# Patient Record
Sex: FEMALE | Race: ASIAN | NOT HISPANIC OR LATINO | URBAN - METROPOLITAN AREA
[De-identification: names, ages, dates, MRNs, and addresses within clinical notes are randomized per-mention and may not be internally consistent; named-entity substitution may affect disease eponyms.]

---

## 2017-05-09 ENCOUNTER — EMERGENCY (EMERGENCY)
Facility: HOSPITAL | Age: 37
LOS: 1 days | Discharge: HOME | End: 2017-05-09
Attending: EMERGENCY MEDICINE | Admitting: EMERGENCY MEDICINE
Payer: COMMERCIAL

## 2017-05-09 VITALS
OXYGEN SATURATION: 100 % | HEART RATE: 75 BPM | RESPIRATION RATE: 20 BRPM | SYSTOLIC BLOOD PRESSURE: 115 MMHG | TEMPERATURE: 99 F | DIASTOLIC BLOOD PRESSURE: 71 MMHG

## 2017-05-09 PROCEDURE — 99283 EMERGENCY DEPT VISIT LOW MDM: CPT

## 2017-05-09 NOTE — ED PROVIDER NOTE - MUSCULOSKELETAL NECK EXAM
PAIN ON MOVEMENT/Mild right trapezius tenderness. No significant tenderness. Pain with neck range of motion. No midline or bony tenderness. PAIN ON MOVEMENT/Mild right trapezius tenderness. No significant tenderness. Pain with neck range of motion to left. No midline or bony tenderness.

## 2017-05-09 NOTE — ED PROVIDER NOTE - NEUROLOGICAL, MLM
Alert and oriented, no focal deficits, no motor or sensory deficits. 5/5 motor strength in all four extremities. 2+ reflexes
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
## 2017-05-09 NOTE — ED PROVIDER NOTE - NS ED MD SCRIBE ATTENDING SCRIBE SECTIONS
DISPOSITION/REVIEW OF SYSTEMS/VITAL SIGNS( Pullset)/PHYSICAL EXAM/HISTORY OF PRESENT ILLNESS/PAST MEDICAL/SURGICAL/SOCIAL HISTORY/HIV
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
## 2017-05-09 NOTE — ED PROVIDER NOTE - DETAILS:
The scribe's documentation has been prepared under my direction and personally reviewed by me in its entirety. I confirm that the note above accurately reflects all work, treatment, procedures, and medical decision making performed by Krishna Peña MD

## 2021-12-03 PROBLEM — J30.2 OTHER SEASONAL ALLERGIC RHINITIS: Chronic | Status: ACTIVE | Noted: 2017-05-09

## 2021-12-23 ENCOUNTER — APPOINTMENT (OUTPATIENT)
Dept: ANTEPARTUM | Facility: CLINIC | Age: 41
End: 2021-12-23
Payer: COMMERCIAL

## 2021-12-23 ENCOUNTER — ASOB RESULT (OUTPATIENT)
Age: 41
End: 2021-12-23

## 2021-12-23 PROCEDURE — 99204 OFFICE O/P NEW MOD 45 MIN: CPT | Mod: 25

## 2021-12-23 PROCEDURE — 76813 OB US NUCHAL MEAS 1 GEST: CPT | Mod: 59

## 2021-12-23 PROCEDURE — 76801 OB US < 14 WKS SINGLE FETUS: CPT

## 2021-12-23 PROCEDURE — 36416 COLLJ CAPILLARY BLOOD SPEC: CPT

## 2021-12-29 ENCOUNTER — ASOB RESULT (OUTPATIENT)
Age: 41
End: 2021-12-29

## 2021-12-29 ENCOUNTER — APPOINTMENT (OUTPATIENT)
Dept: MATERNAL FETAL MEDICINE | Facility: CLINIC | Age: 41
End: 2021-12-29

## 2022-01-04 ENCOUNTER — NON-APPOINTMENT (OUTPATIENT)
Age: 42
End: 2022-01-04

## 2022-01-07 ENCOUNTER — APPOINTMENT (OUTPATIENT)
Dept: OBGYN | Facility: CLINIC | Age: 42
End: 2022-01-07
Payer: COMMERCIAL

## 2022-01-07 VITALS
BODY MASS INDEX: 21.82 KG/M2 | SYSTOLIC BLOOD PRESSURE: 120 MMHG | WEIGHT: 139 LBS | HEART RATE: 116 BPM | HEIGHT: 67 IN | DIASTOLIC BLOOD PRESSURE: 86 MMHG

## 2022-01-07 DIAGNOSIS — Z76.89 PERSONS ENCOUNTERING HEALTH SERVICES IN OTHER SPECIFIED CIRCUMSTANCES: ICD-10-CM

## 2022-01-07 PROCEDURE — 99214 OFFICE O/P EST MOD 30 MIN: CPT

## 2022-01-07 PROCEDURE — 99204 OFFICE O/P NEW MOD 45 MIN: CPT

## 2022-01-08 LAB
C TRACH RRNA SPEC QL NAA+PROBE: NOT DETECTED
N GONORRHOEA RRNA SPEC QL NAA+PROBE: NOT DETECTED
SOURCE AMPLIFICATION: NORMAL

## 2022-01-10 DIAGNOSIS — Z01.818 ENCOUNTER FOR OTHER PREPROCEDURAL EXAMINATION: ICD-10-CM

## 2022-01-11 ENCOUNTER — NON-APPOINTMENT (OUTPATIENT)
Age: 42
End: 2022-01-11

## 2022-01-11 ENCOUNTER — APPOINTMENT (OUTPATIENT)
Dept: OBGYN | Facility: CLINIC | Age: 42
End: 2022-01-11

## 2022-01-11 ENCOUNTER — OUTPATIENT (OUTPATIENT)
Dept: OUTPATIENT SERVICES | Facility: HOSPITAL | Age: 42
LOS: 1 days | End: 2022-01-11

## 2022-01-11 VITALS
HEART RATE: 90 BPM | WEIGHT: 141.1 LBS | OXYGEN SATURATION: 99 % | TEMPERATURE: 99 F | SYSTOLIC BLOOD PRESSURE: 120 MMHG | DIASTOLIC BLOOD PRESSURE: 70 MMHG | RESPIRATION RATE: 17 BRPM | HEIGHT: 67 IN

## 2022-01-11 DIAGNOSIS — O02.1 MISSED ABORTION: ICD-10-CM

## 2022-01-11 DIAGNOSIS — Z76.89 PERSONS ENCOUNTERING HEALTH SERVICES IN OTHER SPECIFIED CIRCUMSTANCES: ICD-10-CM

## 2022-01-11 LAB
ANION GAP SERPL CALC-SCNC: 12 MMOL/L — SIGNIFICANT CHANGE UP (ref 7–14)
BUN SERPL-MCNC: 8 MG/DL — SIGNIFICANT CHANGE UP (ref 7–23)
CALCIUM SERPL-MCNC: 9.2 MG/DL — SIGNIFICANT CHANGE UP (ref 8.4–10.5)
CHLORIDE SERPL-SCNC: 101 MMOL/L — SIGNIFICANT CHANGE UP (ref 98–107)
CO2 SERPL-SCNC: 24 MMOL/L — SIGNIFICANT CHANGE UP (ref 22–31)
CREAT SERPL-MCNC: 0.64 MG/DL — SIGNIFICANT CHANGE UP (ref 0.5–1.3)
GLUCOSE SERPL-MCNC: 106 MG/DL — HIGH (ref 70–99)
HCT VFR BLD CALC: 40.7 % — SIGNIFICANT CHANGE UP (ref 34.5–45)
HGB BLD-MCNC: 13.8 G/DL — SIGNIFICANT CHANGE UP (ref 11.5–15.5)
MCHC RBC-ENTMCNC: 30.1 PG — SIGNIFICANT CHANGE UP (ref 27–34)
MCHC RBC-ENTMCNC: 33.9 GM/DL — SIGNIFICANT CHANGE UP (ref 32–36)
MCV RBC AUTO: 88.7 FL — SIGNIFICANT CHANGE UP (ref 80–100)
NRBC # BLD: 0 /100 WBCS — SIGNIFICANT CHANGE UP
NRBC # FLD: 0 K/UL — SIGNIFICANT CHANGE UP
PLATELET # BLD AUTO: 262 K/UL — SIGNIFICANT CHANGE UP (ref 150–400)
POTASSIUM SERPL-MCNC: 3.7 MMOL/L — SIGNIFICANT CHANGE UP (ref 3.5–5.3)
POTASSIUM SERPL-SCNC: 3.7 MMOL/L — SIGNIFICANT CHANGE UP (ref 3.5–5.3)
RBC # BLD: 4.59 M/UL — SIGNIFICANT CHANGE UP (ref 3.8–5.2)
RBC # FLD: 12.2 % — SIGNIFICANT CHANGE UP (ref 10.3–14.5)
SODIUM SERPL-SCNC: 137 MMOL/L — SIGNIFICANT CHANGE UP (ref 135–145)
WBC # BLD: 7.13 K/UL — SIGNIFICANT CHANGE UP (ref 3.8–10.5)
WBC # FLD AUTO: 7.13 K/UL — SIGNIFICANT CHANGE UP (ref 3.8–10.5)

## 2022-01-11 RX ORDER — SODIUM CHLORIDE 9 MG/ML
1000 INJECTION, SOLUTION INTRAVENOUS
Refills: 0 | Status: DISCONTINUED | OUTPATIENT
Start: 2022-01-14 | End: 2022-01-28

## 2022-01-11 RX ORDER — ASPIRIN/CALCIUM CARB/MAGNESIUM 324 MG
1 TABLET ORAL
Qty: 0 | Refills: 0 | DISCHARGE

## 2022-01-11 NOTE — H&P PST ADULT - HISTORY OF PRESENT ILLNESS
41 year old female  , with PMH of GDM presents to PST with pre op diagnosis of incomplete , for pre op evaluation prior to scheduled surgery - Dilation and evacuation. Pt reports of routine pelvic sonogram for 13 weeks of pregnancy and found to have absence of fetal heart rate, Plan for D&C. 41 year old female  , with PMH of GDM presents to PST with pre op diagnosis of incomplete , for pre op evaluation prior to scheduled surgery - Dilation and evacuation. Pt reports of routine pelvic sonogram for 13 weeks of pregnancy showed fetal abnormality  and found to have absence of fetal heart rate, Plan for D&C. Denies vaginal bleeding or abdominal pain.

## 2022-01-11 NOTE — H&P PST ADULT - PROBLEM SELECTOR PLAN 1
Pt is tentatively scheduled for Dilation and evacuation on 01/14/2022.  Pepcid provided for GI prophylaxis.  Pre op instructions given.  Covid test was done today, pending results.

## 2022-01-11 NOTE — H&P PST ADULT - NSICDXPASTMEDICALHX_GEN_ALL_CORE_FT
PAST MEDICAL HISTORY:  Gestational diabetes     Seasonal allergies      PAST MEDICAL HISTORY:  Cystic hygroma this pregnancy as per MFM notes    Gestational diabetes 2nd pregnancy    Seasonal allergies

## 2022-01-13 ENCOUNTER — TRANSCRIPTION ENCOUNTER (OUTPATIENT)
Age: 42
End: 2022-01-13

## 2022-01-13 LAB — SARS-COV-2 N GENE NPH QL NAA+PROBE: NOT DETECTED

## 2022-01-13 NOTE — ASU PATIENT PROFILE, ADULT - NSCAFFEINEWITH_GEN_ALL_CORE_SD
May 24, 2017      Eliana MARCO Alarconen  [address] [place] [address]    Dear ,      I am happy to inform you that your recent cervical cancer screening test (PAP smear) was normal.      Preventative screenings such as this help to ensure your health for years to come. You should repeat a pap smear in 1 year, unless otherwise directed.      You will still need to return to the clinic every year for your annual exam and other preventive tests.     Please contact the clinic at [phone] if you have further questions.       Sincerely,      Juan Love MD/ronak       none

## 2022-01-13 NOTE — ASU PATIENT PROFILE, ADULT - NSICDXPASTMEDICALHX_GEN_ALL_CORE_FT
PAST MEDICAL HISTORY:  Cystic hygroma this pregnancy as per MFM notes    Gestational diabetes 2nd pregnancy    Seasonal allergies

## 2022-01-13 NOTE — ASU PATIENT PROFILE, ADULT - FALL HARM RISK - UNIVERSAL INTERVENTIONS
Bed in lowest position, wheels locked, appropriate side rails in place/Call bell, personal items and telephone in reach/Instruct patient to call for assistance before getting out of bed or chair/Non-slip footwear when patient is out of bed/[place] to call system/Physically safe environment - no spills, clutter or unnecessary equipment/Purposeful Proactive Rounding/Room/bathroom lighting operational, light cord in reach

## 2022-01-14 ENCOUNTER — OUTPATIENT (OUTPATIENT)
Dept: OUTPATIENT SERVICES | Facility: HOSPITAL | Age: 42
LOS: 1 days | Discharge: HOME | End: 2022-01-14
Payer: COMMERCIAL

## 2022-01-14 ENCOUNTER — OUTPATIENT (OUTPATIENT)
Dept: OUTPATIENT SERVICES | Facility: HOSPITAL | Age: 42
LOS: 1 days | End: 2022-01-14
Payer: COMMERCIAL

## 2022-01-14 ENCOUNTER — APPOINTMENT (OUTPATIENT)
Dept: OBGYN | Facility: HOSPITAL | Age: 42
End: 2022-01-14

## 2022-01-14 ENCOUNTER — RESULT REVIEW (OUTPATIENT)
Age: 42
End: 2022-01-14

## 2022-01-14 VITALS
DIASTOLIC BLOOD PRESSURE: 63 MMHG | OXYGEN SATURATION: 100 % | HEART RATE: 108 BPM | RESPIRATION RATE: 17 BRPM | HEIGHT: 67 IN | TEMPERATURE: 100 F | SYSTOLIC BLOOD PRESSURE: 132 MMHG | WEIGHT: 141.1 LBS

## 2022-01-14 VITALS
RESPIRATION RATE: 18 BRPM | SYSTOLIC BLOOD PRESSURE: 124 MMHG | TEMPERATURE: 100 F | HEART RATE: 88 BPM | OXYGEN SATURATION: 100 % | DIASTOLIC BLOOD PRESSURE: 62 MMHG

## 2022-01-14 DIAGNOSIS — O02.1 MISSED ABORTION: ICD-10-CM

## 2022-01-14 DIAGNOSIS — Z76.89 PERSONS ENCOUNTERING HEALTH SERVICES IN OTHER SPECIFIED CIRCUMSTANCES: ICD-10-CM

## 2022-01-14 LAB
BASOPHILS # BLD AUTO: 0.03 K/UL — SIGNIFICANT CHANGE UP (ref 0–0.2)
BASOPHILS NFR BLD AUTO: 0.2 % — SIGNIFICANT CHANGE UP (ref 0–2)
EOSINOPHIL # BLD AUTO: 0 K/UL — SIGNIFICANT CHANGE UP (ref 0–0.5)
EOSINOPHIL NFR BLD AUTO: 0 % — SIGNIFICANT CHANGE UP (ref 0–6)
HCT VFR BLD CALC: 42.1 % — SIGNIFICANT CHANGE UP (ref 34.5–45)
HGB BLD-MCNC: 14.4 G/DL — SIGNIFICANT CHANGE UP (ref 11.5–15.5)
IANC: 14.1 K/UL — HIGH (ref 1.5–8.5)
IMM GRANULOCYTES NFR BLD AUTO: 0.3 % — SIGNIFICANT CHANGE UP (ref 0–1.5)
LYMPHOCYTES # BLD AUTO: 0.6 K/UL — LOW (ref 1–3.3)
LYMPHOCYTES # BLD AUTO: 4 % — LOW (ref 13–44)
MCHC RBC-ENTMCNC: 29.9 PG — SIGNIFICANT CHANGE UP (ref 27–34)
MCHC RBC-ENTMCNC: 34.2 GM/DL — SIGNIFICANT CHANGE UP (ref 32–36)
MCV RBC AUTO: 87.3 FL — SIGNIFICANT CHANGE UP (ref 80–100)
MONOCYTES # BLD AUTO: 0.09 K/UL — SIGNIFICANT CHANGE UP (ref 0–0.9)
MONOCYTES NFR BLD AUTO: 0.6 % — LOW (ref 2–14)
NEUTROPHILS # BLD AUTO: 14.1 K/UL — HIGH (ref 1.8–7.4)
NEUTROPHILS NFR BLD AUTO: 94.9 % — HIGH (ref 43–77)
NRBC # BLD: 0 /100 WBCS — SIGNIFICANT CHANGE UP
NRBC # FLD: 0 K/UL — SIGNIFICANT CHANGE UP
PLATELET # BLD AUTO: 262 K/UL — SIGNIFICANT CHANGE UP (ref 150–400)
RBC # BLD: 4.82 M/UL — SIGNIFICANT CHANGE UP (ref 3.8–5.2)
RBC # FLD: 11.9 % — SIGNIFICANT CHANGE UP (ref 10.3–14.5)
WBC # BLD: 14.86 K/UL — HIGH (ref 3.8–10.5)
WBC # FLD AUTO: 14.86 K/UL — HIGH (ref 3.8–10.5)

## 2022-01-14 PROCEDURE — 81229 CYTOG ALYS CHRML ABNR SNPCGH: CPT

## 2022-01-14 PROCEDURE — 88305 TISSUE EXAM BY PATHOLOGIST: CPT | Mod: 26

## 2022-01-14 PROCEDURE — 59841 INDUCED ABORTION DILAT&EVAC: CPT | Mod: GC

## 2022-01-14 PROCEDURE — 88233 TISSUE CULTURE SKIN/BIOPSY: CPT

## 2022-01-14 RX ORDER — SODIUM CHLORIDE 9 MG/ML
500 INJECTION, SOLUTION INTRAVENOUS ONCE
Refills: 0 | Status: COMPLETED | OUTPATIENT
Start: 2022-01-14 | End: 2022-01-14

## 2022-01-14 RX ORDER — SODIUM CHLORIDE 9 MG/ML
1000 INJECTION, SOLUTION INTRAVENOUS
Refills: 0 | Status: DISCONTINUED | OUTPATIENT
Start: 2022-01-14 | End: 2022-01-28

## 2022-01-14 RX ORDER — IBUPROFEN 200 MG
1 TABLET ORAL
Qty: 0 | Refills: 0 | DISCHARGE

## 2022-01-14 RX ORDER — ONDANSETRON 8 MG/1
4 TABLET, FILM COATED ORAL
Refills: 0 | Status: DISCONTINUED | OUTPATIENT
Start: 2022-01-14 | End: 2022-01-28

## 2022-01-14 RX ORDER — ACETAMINOPHEN 500 MG
2 TABLET ORAL
Qty: 0 | Refills: 0 | DISCHARGE

## 2022-01-14 RX ORDER — FENTANYL CITRATE 50 UG/ML
50 INJECTION INTRAVENOUS
Refills: 0 | Status: DISCONTINUED | OUTPATIENT
Start: 2022-01-14 | End: 2022-01-14

## 2022-01-14 RX ADMIN — SODIUM CHLORIDE 1000 MILLILITER(S): 9 INJECTION, SOLUTION INTRAVENOUS at 16:45

## 2022-01-14 NOTE — ASU DISCHARGE PLAN (ADULT/PEDIATRIC) - NS MD DC FALL RISK RISK
For information on Fall & Injury Prevention, visit: [email or web address] OR  [email or web address] OR  https://www.cdc.gov/steadi/patient.html

## 2022-01-14 NOTE — BRIEF OPERATIVE NOTE - NSICDXBRIEFPOSTOP_GEN_ALL_CORE_FT
POST-OP DIAGNOSIS:  Fetal demise before 20 weeks with retention of dead fetus 14-Jan-2022 15:00:17  Manasa Bernal

## 2022-01-14 NOTE — CHART NOTE - NSCHARTNOTEFT_GEN_A_CORE
Patient seen and examined at bedside, recently post-op. Patient complained of lightheadedness, dizziness and weakness upon standing. The patient used a bedpan to urinate due to her weakness. Denies CP, SOB, N/V, fevers, and chills.    Vital Signs Last 24 Hours  T(C): 37.8 (01-14-22 @ 16:00), Max: 37.8 (01-14-22 @ 16:00)  HR: 89 (01-14-22 @ 16:00) (78 - 108)  BP: 111/65 (01-14-22 @ 16:00) (102/45 - 132/63)  RR: 16 (01-14-22 @ 16:00) (7 - 20)  SpO2: 98% (01-14-22 @ 16:00) (98% - 100%)      I&O's Summary      Physical Exam:  General: NAD  CV: NR, RR, S1, S2, no M/R/G  Lungs: CTA-B  Abdomen: Soft, appropriately tender, non-distended, +BS  Vagina: Bleeding   Ext: No pain or swelling    Labs:             13.8   7.13  )-----------( 262      ( 01-11 @ 19:23 )             40.7         MEDICATIONS  (STANDING):  lactated ringers Bolus 500 milliLiter(s) IV Bolus once  lactated ringers. 1000 milliLiter(s) (30 mL/Hr) IV Continuous <Continuous>  lactated ringers. 1000 milliLiter(s) (125 mL/Hr) IV Continuous <Continuous>    MEDICATIONS  (PRN):  fentaNYL    Injectable 50 MICROGram(s) IV Push every 15 minutes PRN Severe Pain (7 - 10)  ondansetron Injectable 4 milliGRAM(s) IV Push every 20 minutes PRN Nausea and/or Vomiting      yo s/p recovering well in acute post-operative state.    Neuro: Pain controlled. PO pain meds vs. PCA  CV: Hemodynamically stable  Pulm: Encourage oob/ambulation, incentive spirometer at bedside  GI: Regular Diet vs. NPO vs. Advance diet as tolerated  : Maki in place vs. voiding spontaneously vs. DTV@  Heme: HSQ and SCDs for DVT PPX  ID: afebrile  Endo: no active issues  Dispo: continue routine post-op care         JUNE Gaines, PGY-1  d/w GYN Team Patient seen and examined at bedside, recently post-op. Patient complained of lightheadedness, dizziness and weakness upon standing. The patient used a bedpan to urinate due to her weakness. Denies CP, SOB, N/V, fevers, and chills.    Vital Signs Last 24 Hours  T(C): 37.8 (01-14-22 @ 16:00), Max: 37.8 (01-14-22 @ 16:00)  HR: 89 (01-14-22 @ 16:00) (78 - 108)  BP: 111/65 (01-14-22 @ 16:00) (102/45 - 132/63)  RR: 16 (01-14-22 @ 16:00) (7 - 20)  SpO2: 98% (01-14-22 @ 16:00) (98% - 100%)      I&O's Summary      Physical Exam:  General: NAD  CV: NR, RR, S1, S2, no M/R/G  Lungs: CTA-B  Abdomen: Soft, appropriately tender, non-distended, +BS  Vagina: Bleeding   Ext: No pain or swelling    Labs:             13.8   7.13  )-----------( 262      ( 01-11 @ 19:23 )             40.7         MEDICATIONS  (STANDING):  lactated ringers Bolus 500 milliLiter(s) IV Bolus once  lactated ringers. 1000 milliLiter(s) (30 mL/Hr) IV Continuous <Continuous>  lactated ringers. 1000 milliLiter(s) (125 mL/Hr) IV Continuous <Continuous>    MEDICATIONS  (PRN):  fentaNYL    Injectable 50 MICROGram(s) IV Push every 15 minutes PRN Severe Pain (7 - 10)  ondansetron Injectable 4 milliGRAM(s) IV Push every 20 minutes PRN Nausea and/or Vomiting      yo s/p recovering well in acute post-operative state.    Neuro: Pain controlled. PO pain meds vs. PCA  CV: Hemodynamically stable  Pulm: Encourage oob/ambulation, incentive spirometer at bedside  GI: Regular Diet vs. NPO vs. Advance diet as tolerated  : Maki in place vs. voiding spontaneously vs. DTV@  Heme: HSQ and SCDs for DVT PPX  ID: afebrile  Endo: no active issues  Dispo: continue routine post-op care       d/w Dr. Zacarias Bernal, PGY1 Patient seen and examined at bedside, recently post-op. Patient complained of lightheadedness, dizziness and weakness upon standing. The patient used a bedpan to urinate due to her weakness. Denies CP, SOB, N/V, fevers, and chills.    Vital Signs Last 24 Hours  T(C): 37.8 (01-14-22 @ 16:00), Max: 37.8 (01-14-22 @ 16:00)  HR: 89 (01-14-22 @ 16:00) (78 - 108)  BP: 111/65 (01-14-22 @ 16:00) (102/45 - 132/63)  RR: 16 (01-14-22 @ 16:00) (7 - 20)  SpO2: 98% (01-14-22 @ 16:00) (98% - 100%)    Orthostatics:  125/59 HR 80s (lying)  128/74 HR 80s (sitting)  136/96 HR 130s-120s (standing)    I&O's Summary      14 Jan 2022 07:01  -  14 Jan 2022 17:56  --------------------------------------------------------  IN:    Oral Fluid: 274 mL  Total IN: 274 mL    OUT:    Voided (mL): 400 mL  Total OUT: 400 mL    Total NET: -126 mL      Physical Exam:  General: NAD  CV: NR, RR, S1, S2, no M/R/G  Lungs: CTA-B  Abdomen: Soft, appropriately tender, non-distended, +BS  Vagina: Bleeding   Ext: No pain or swelling    Labs:             13.8   7.13  )-----------( 262      ( 01-11 @ 19:23 )             40.7         MEDICATIONS  (STANDING):  lactated ringers Bolus 500 milliLiter(s) IV Bolus once  lactated ringers 1000 milliLiter(s) (30 mL/Hr) IV Continuous <Continuous>  lactated ringers 1000 milliLiter(s) (125 mL/Hr) IV Continuous <Continuous>    MEDICATIONS  (PRN):  fentaNYL    Injectable 50 MICROGram(s) IV Push every 15 minutes PRN Severe Pain (7 - 10)  ondansetron Injectable 4 milliGRAM(s) IV Push every 20 minutes PRN Nausea and/or Vomiting      42 y/o s/p DVC with lightheadedness, tachycardia to the 120s with standing and weakness post-op. STAT CBC, 500cc bolus and reassess orthostatics.     Neuro: Pain controlled. PO pain meds   CV: tachycardic to 120s upon standing.   Pulm: Encourage oob/ambulation; denies SOB  GI: Advance diet as tolerated  : Voiding spontaneously  Heme: encourage ambulation, SCDs if not ambulating  ID: afebrile  Endo: no active issues  Dispo: continue to observe until Bolus is completed and CBC is resulted and reassess patient's symptoms after bolus.      d/w Dr. Zacarias Bernal, PGY1 Patient seen and examined at bedside, recently post-op. Patient complained of lightheadedness, dizziness and weakness upon standing. The patient used a bedpan to urinate due to her weakness. Denies CP, SOB, N/V, fevers, and chills.    Vital Signs Last 24 Hours  T(C): 37.8 (01-14-22 @ 16:00), Max: 37.8 (01-14-22 @ 16:00)  HR: 89 (01-14-22 @ 16:00) (78 - 108)  BP: 111/65 (01-14-22 @ 16:00) (102/45 - 132/63)  RR: 16 (01-14-22 @ 16:00) (7 - 20)  SpO2: 98% (01-14-22 @ 16:00) (98% - 100%)    Orthostatics:  125/59 HR 80s (lying)  128/74 HR 80s (sitting)  136/96 HR 130s-120s (standing)    I&O's Summary      14 Jan 2022 07:01  -  14 Jan 2022 17:56  --------------------------------------------------------  IN:    Oral Fluid: 274 mL  Total IN: 274 mL    OUT:    Voided (mL): 400 mL  Total OUT: 400 mL    Total NET: -126 mL      Physical Exam:  General: NAD  Abdomen: Soft, appropriately tender, non-distended, +BS  Vagina: 30-50cc of blood located on bedding, uterine fundus firm with no lochia observed with palpation. Scant blood on pad  Ext: No pain or swelling in bilateral extremities, no calf tenderness bilaterally    Labs:             13.8   7.13  )-----------( 262      ( 01-11 @ 19:23 )             40.7         MEDICATIONS  (STANDING):  lactated ringers Bolus 500 milliLiter(s) IV Bolus once  lactated ringers 1000 milliLiter(s) (30 mL/Hr) IV Continuous <Continuous>  lactated ringers 1000 milliLiter(s) (125 mL/Hr) IV Continuous <Continuous>    MEDICATIONS  (PRN):  fentaNYL    Injectable 50 MICROGram(s) IV Push every 15 minutes PRN Severe Pain (7 - 10)  ondansetron Injectable 4 milliGRAM(s) IV Push every 20 minutes PRN Nausea and/or Vomiting      40 y/o s/p DVC with lightheadedness, tachycardia to the 120s with standing and weakness post-op. STAT CBC, 500cc bolus and reassess orthostatics.     Neuro: Pain controlled. PO pain meds   CV: tachycardic to 120s upon standing.   Pulm: Encourage oob/ambulation; denies SOB  GI: Advance diet as tolerated  : Voiding spontaneously  Heme: encourage ambulation, SCDs if not ambulating  ID: afebrile  Endo: no active issues  Dispo: continue to observe until Bolus is completed and CBC is resulted and reassess patient's symptoms after bolus.      d/w Dr. Zacarias Bernal, PGY1

## 2022-01-14 NOTE — CHART NOTE - NSCHARTNOTEFT_GEN_A_CORE
R4 GYN POST-OP CHECK    S: 41y Female s/p D&E for IUFD at 13w6d. Case uncomplicated. EBL 40cc. In PACU patient reported dizziness and tachycardia to 120s with standing on orthostatics. CBC sent. H/h up from pre-op, suggestive of hemodilution. 500cc bolus given. Patient seen and evaluated at bedside. Patient clinically improved, denies dizziness with repeat orthostatics. Patient reports pain controlled with analgesia. Pt denies N/V, SOB, CP, palpitations, fever/chills. Tolerating clears.  Not OOB yet.    O:   T(C): 37.4 (01-14-22 @ 17:30), Max: 37.8 (01-14-22 @ 16:00)  HR: 105 (01-14-22 @ 18:00) (78 - 108)  BP: 116/66 (01-14-22 @ 18:00) (102/45 - 141/61)  RR: 16 (01-14-22 @ 18:00) (7 - 25)  SpO2: 99% (01-14-22 @ 18:00) (97% - 100%)  Wt(kg): --  I&O's Summary    14 Jan 2022 07:01  -  14 Jan 2022 18:12  --------------------------------------------------------  IN: 274 mL / OUT: 400 mL / NET: -126 mL    Gen: Resting comfortably in bed, NAD  Skin: brisk capillary refill  CV: RRR at rest  Lungs: non-labored breathing on room air  Abd: soft, appropriately tender, non-distended  : no bleeding on pad  Ext: SCD's in place and functional, non-tender b/l, no edema      A/P: 41y Female s/p D&E for IUFD at 13w6d. Case uncomplicated. EBL 40cc. In PACU patient reported dizziness and tachycardia to 120s with standing on orthostatics. CBC sent. H/h increased from pre-op, suggestive of hemodilution. 500cc bolus given. Patient seen and evaluated at bedside. Patient clinically improved. Normotensive. Resting HR 90s. Tachycardic to 110s when standing, 120s when looking at the HR monitor. Afebrile. Asymptomatic. Minimal bleeding.     - Patient stable for discharge one tolerating solid food in addition to liquids  - ED return precautions discussed in depth    BAUTISTA Canales PGY4  d/w Dr. Adames R4 GYN POST-OP CHECK    S: 41y Female s/p D&E for IUFD at 13w6d. Case uncomplicated. EBL 40cc. In PACU patient reported dizziness and tachycardia to 120s with standing on orthostatics. CBC sent. H/h up from pre-op, suggestive of hemodilution. 500cc bolus given. Patient seen and evaluated at bedside. Patient clinically improved, denies dizziness with repeat orthostatics. Patient reports pain controlled with analgesia. Pt denies N/V, SOB, CP, palpitations, fever/chills. Tolerating clears. Requesting solids.    O:   T(C): 37.4 (01-14-22 @ 17:30), Max: 37.8 (01-14-22 @ 16:00)  HR: 105 (01-14-22 @ 18:00) (78 - 108)  BP: 116/66 (01-14-22 @ 18:00) (102/45 - 141/61)  RR: 16 (01-14-22 @ 18:00) (7 - 25)  SpO2: 99% (01-14-22 @ 18:00) (97% - 100%)  Wt(kg): --  I&O's Summary    14 Jan 2022 07:01  -  14 Jan 2022 18:12  --------------------------------------------------------  IN: 274 mL / OUT: 400 mL / NET: -126 mL    Gen: Resting comfortably in bed, NAD  Skin: brisk capillary refill  CV: RRR at rest  Lungs: non-labored breathing on room air  Abd: soft, appropriately tender, non-distended  : no bleeding on pad  Ext: SCD's in place and functional, non-tender b/l, no edema      A/P: 41y Female s/p D&E for IUFD at 13w6d. Case uncomplicated. EBL 40cc. In PACU patient reported dizziness and tachycardia to 120s with standing on orthostatics. CBC sent. H/h increased from pre-op, suggestive of hemodilution. 500cc bolus given. Patient seen and evaluated at bedside. Patient clinically improved. Normotensive. Resting HR 90s. Tachycardic to 110s when standing, 120s when looking at the HR monitor. Pre-op HR was 108. Afebrile. Asymptomatic. Minimal bleeding.     - No additional interventions recommended at this time  - ED return precautions discussed in depth including but not limited to signs of infection, heavy bleeding  - Patient stable for discharge once tolerating solid food in addition to liquids  - All questions answered    BAUTISTA Canales PGY4  d/w Dr. Adames

## 2022-01-14 NOTE — ASU DISCHARGE PLAN (ADULT/PEDIATRIC) - ASU DC SPECIAL INSTRUCTIONSFT
Follow up with Dr. Adames in 2 weeks for a post-op check.    Regular diet as tolerated, regular activity as tolerated, no heavy lifting for first two weeks.      Nothing per vagina: no intercourse, tampons or douching.  No submerging.     Call your provider if you experience fevers, chills, worsening abdominal pain, inability to urinate or heavy vaginal bleeding.

## 2022-01-14 NOTE — BRIEF OPERATIVE NOTE - NSICDXBRIEFPROCEDURE_GEN_ALL_CORE_FT
PROCEDURES:  Dilation and evacuation of uterus using suction curettage 14-Jan-2022 14:58:00  Manasa Bernal

## 2022-01-14 NOTE — ASU DISCHARGE PLAN (ADULT/PEDIATRIC) - CARE PROVIDER_API CALL
Maxx Adames)  OBSGYN  General  [facility], [address] [place] [address]  Phone: [phone]  Fax: [phone]  Follow Up Time:

## 2022-01-14 NOTE — BRIEF OPERATIVE NOTE - OPERATION/FINDINGS
EUA: 14week size uterus with grossly normal structure. No adnexal masses palpated. D+C: Products of Conception observed at External Cervical Os upon insertion of speculum. Cervix dilated to #45 using Jessica dilator. 14mm and 8mm suction curettes and medium Wahl curette used for removal of products of conception under US guidance. Excellent hemostasis noted. Sponge count correctx2.

## 2022-01-19 ENCOUNTER — NON-APPOINTMENT (OUTPATIENT)
Age: 42
End: 2022-01-19

## 2022-01-19 LAB — SURGICAL PATHOLOGY STUDY: SIGNIFICANT CHANGE UP

## 2022-01-20 PROBLEM — D18.1 LYMPHANGIOMA, ANY SITE: Chronic | Status: ACTIVE | Noted: 2022-01-11

## 2022-01-20 PROBLEM — O24.419 GESTATIONAL DIABETES MELLITUS IN PREGNANCY, UNSPECIFIED CONTROL: Chronic | Status: ACTIVE | Noted: 2022-01-11

## 2022-01-28 ENCOUNTER — APPOINTMENT (OUTPATIENT)
Dept: OBGYN | Facility: CLINIC | Age: 42
End: 2022-01-28
Payer: COMMERCIAL

## 2022-01-28 VITALS
SYSTOLIC BLOOD PRESSURE: 117 MMHG | HEART RATE: 109 BPM | DIASTOLIC BLOOD PRESSURE: 83 MMHG | HEIGHT: 67 IN | BODY MASS INDEX: 21.5 KG/M2 | WEIGHT: 137 LBS

## 2022-01-28 PROCEDURE — 99212 OFFICE O/P EST SF 10 MIN: CPT

## 2022-01-28 RX ORDER — MISOPROSTOL 200 UG/1
200 TABLET ORAL
Qty: 4 | Refills: 0 | Status: COMPLETED | COMMUNITY
Start: 2022-01-07 | End: 2022-01-28

## 2022-01-29 LAB — HCG SERPL-MCNC: 2 MIU/ML

## 2023-02-21 NOTE — ASU PATIENT PROFILE, ADULT - MEDICATION ADMINISTRATION INFO, PROFILE
no concerns Libtayo Counseling- I discussed with the patient the risks of Libtayo including but not limited to nausea, vomiting, diarrhea, and bone or muscle pain.  The patient verbalized understanding of the proper use and possible adverse effects of Libtayo.  All of the patient's questions and concerns were addressed.

## (undated) DEVICE — DRAPE LIGHT HANDLE COVER (GREEN)

## (undated) DEVICE — GOWN LG

## (undated) DEVICE — LABELS BLANK W PEN

## (undated) DEVICE — WARMING BLANKET FULL ADULT

## (undated) DEVICE — PROTECTOR HEEL / ELBOW FLUFFY

## (undated) DEVICE — VACUUM CURETTE BERKLEY OLYMPUS STRAIGHT 14MM X 3/8"

## (undated) DEVICE — GOWN XL

## (undated) DEVICE — BOTTLE COLLECTION KIT CAP 3SM 2 LG

## (undated) DEVICE — TISSUE TRAP BERKELEY SAFE TOUCH

## (undated) DEVICE — VISITEC 4X4

## (undated) DEVICE — VACUUM CURETTE BUSSE HOSP CURVED 8MM

## (undated) DEVICE — PACK PERI GYN

## (undated) DEVICE — GLV 7 PROTEXIS (CREAM) MICRO

## (undated) DEVICE — VACUUM CURETTE BUSSE HOSP CURVED 7MM

## (undated) DEVICE — BASIN SET DOUBLE

## (undated) DEVICE — DRSG PAD SANITARY OB

## (undated) DEVICE — VENODYNE/SCD SLEEVE CALF MEDIUM

## (undated) DEVICE — DRSG TELFA 3 X 8

## (undated) DEVICE — DRAPE TOWEL BLUE 17" X 24"

## (undated) DEVICE — TUBING GYRUS ACMI COLLECTION SET 6FT

## (undated) DEVICE — SOL IRR POUR H2O 500ML

## (undated) DEVICE — VACUUM CURETTE BERKLEY OLYMPUS CURVED 9MM

## (undated) DEVICE — PREP BETADINE SPONGE STICKS

## (undated) DEVICE — POSITIONER STRAP ARMBOARD VELCRO TS-30